# Patient Record
Sex: MALE
[De-identification: names, ages, dates, MRNs, and addresses within clinical notes are randomized per-mention and may not be internally consistent; named-entity substitution may affect disease eponyms.]

---

## 2018-11-12 ENCOUNTER — HOSPITAL ENCOUNTER (EMERGENCY)
Dept: HOSPITAL 80 - FED | Age: 48
Discharge: HOME | End: 2018-11-12
Payer: COMMERCIAL

## 2018-11-12 VITALS — SYSTOLIC BLOOD PRESSURE: 139 MMHG | DIASTOLIC BLOOD PRESSURE: 95 MMHG

## 2018-11-12 DIAGNOSIS — F17.200: ICD-10-CM

## 2018-11-12 DIAGNOSIS — F10.10: ICD-10-CM

## 2018-11-12 DIAGNOSIS — F19.10: Primary | ICD-10-CM

## 2018-11-12 NOTE — EDPHY
H & P


Stated Complaint: Benzodiazepine addiction


Time Seen by Provider: 11/12/18 19:57


HPI/ROS: 





CHIEF COMPLAINT:  Wants help with detox





HISTORY OF PRESENT ILLNESS:  Patient is a 48-year-old man with a history of 

Ativan abuse as well as alcohol abuse.  He states that he last used Ativan 

yesterday.  He originally began taking it for anxiety but then began taking a 

recreationally up to about 10 pills per day.  He has also been drinking alcohol 

and states that he stopped yesterday.  He does not show any tremors or 

tachycardia.  He states that he has had seizures from alcohol withdrawal 

before.  He was in [place] for 3 days last week but return to the 

substances after discharge.


Severity:  Moderate


Modifying factors:  Currently asymptomatic





REVIEW OF SYSTEMS:


Constitutional:  denies: chills, fever, recent illness, recent injury


EENTM: denies: blurred vision, double vision, nose congestion


Respiratory: denies: cough, shortness of breath


Cardiac: denies: chest pain, irregular heart rate, lightheadedness, palpitations


Gastrointestinal/Abdominal: denies: abdominal pain, diarrhea, nausea, vomiting, 

blood streaked stools


Genitourinary: denies: dysuria, frequency, hematuria, pain


Musculoskeletal: denies: joint pain, muscle pain


Skin: denies: lesions, rash, jaundice, bruising


Neurological: denies: headache, numbness, paresthesia, tingling, dizziness, 

weakness


Hematologic/Lymphatic: denies: blood clots, easy bleeding, easy bruising


Immunologic/allergic: denies: HIV/AIDS, transplant


 10 systems reviewed and negative except as noted





EXAM:


GENERAL:  Well-appearing, well-nourished and in no acute distress.


HEAD:  Atraumatic, normocephalic.


EYES:  Pupils equal round and reactive to light, extraocular movements intact, 

sclera anicteric, conjunctiva are normal.


ENT:  TMs normal, nares patent, oropharynx clear without exudates.  Moist 

mucous membranes.


NECK:  Normal range of motion, supple without lymphadenopathy or JVD.


LUNGS:  Breath sounds clear to auscultation bilaterally and equal.  No wheezes 

rales or rhonchi.


HEART:  Regular rate and rhythm without murmurs, rubs or gallops.


ABDOMEN:  Soft, nontender, normoactive bowel sounds.  No guarding, no rebound.  

No masses appreciated. 


BACK:  No CVA tenderness, no spinal tenderness, step-offs or deformities


EXTREMITIES:  Normal range of motion, no pitting or edema.  No clubbing or 

cyanosis.


NEUROLOGICAL:  Cranial nerves II through XII grossly intact.  Normal speech, 

normal gait.  5/5 strength, normal movement in all extremities, normal sensation

, normal reflexes


PSYCH:  Normal mood, normal affect.


SKIN:  Warm, dry, normal turgor, no visible rashes or lesions.








Source: Patient


Exam Limitations: No limitations





- Personal History


Current Tetanus/Diphtheria Vaccine: Yes


Current Tetanus Diphtheria and Acellular Pertussis (TDAP): Yes





- Medical/Surgical History


Hx Asthma: No


Hx Chronic Respiratory Disease: No


Hx Diabetes: No


Hx Cardiac Disease: No


Hx Renal Disease: No


Hx Cirrhosis: No


Hx Alcoholism: No


Hx HIV/AIDS: No


Hx Splenectomy or Spleen Trauma: No


Other PMH: alcohol seizures, high cholesterol





- Family History


Significant Family History: No pertinent family hx





- Social History


Smoking Status: Heavy smoker


Alcohol Use: Heavy


Drug Use: Other


Constitutional: 


 Initial Vital Signs











Temperature (C)  36.5 C   11/12/18 19:48


 


Heart Rate  105 H  11/12/18 19:48


 


Respiratory Rate  20   11/12/18 19:48


 


Blood Pressure  139/95 H  11/12/18 19:48


 


O2 Sat (%)  98   11/12/18 19:48








 











O2 Delivery Mode               Room Air














Allergies/Adverse Reactions: 


 





No Known Allergies Allergy (Unverified 11/12/18 19:47)


 








Home Medications: 














 Medication  Instructions  Recorded


 


NK [No Known Home Meds]  11/12/18














Medical Decision Making


ED Course/Re-evaluation: 





 is not available and neither is mental health currently.  Patient 

was recently at [facility].  Will calling them to see if he can return 

there to be evaluated for further treatment.





8:30 p.m we were able to contact [facility].  They state that they can 

make an appointment to me with him tomorrow.  Until then he can stand warming 

shelter tonight.  We have been arranging this.  The patient is happy with this 

plan.  We discussed indications for returning.


Differential Diagnosis: 





Partial list of the Differential diagnosis considered include but were not 

limited to;  polysubstance abuse, withdrawal, and although unlikely based on 

the history and physical exam, I also considered head injury, infection.  





Departure





- Departure


Disposition: Home, Routine, Self-Care


Clinical Impression: 


 Polysubstance abuse





Condition: Fair


Instructions:  Polysubstance Abuse (ED)


Additional Instructions: 


Call [facility] at [phone] to set up an appointment.


Referrals: 


NONE *PRIMARY CARE P,. [Primary Care Provider] - As per Instructions


[facility],. [Clinic] - 2-3 days, call for appt.

## 2018-11-15 ENCOUNTER — HOSPITAL ENCOUNTER (INPATIENT)
Dept: HOSPITAL 80 - FED | Age: 48
LOS: 5 days | Discharge: HOME | DRG: 880 | End: 2018-11-20
Attending: PSYCHIATRY & NEUROLOGY | Admitting: PSYCHIATRY & NEUROLOGY
Payer: COMMERCIAL

## 2018-11-15 DIAGNOSIS — Z59.0: ICD-10-CM

## 2018-11-15 DIAGNOSIS — F17.210: ICD-10-CM

## 2018-11-15 DIAGNOSIS — Z76.5: ICD-10-CM

## 2018-11-15 DIAGNOSIS — E78.5: ICD-10-CM

## 2018-11-15 DIAGNOSIS — F10.230: ICD-10-CM

## 2018-11-15 DIAGNOSIS — Z59.6: ICD-10-CM

## 2018-11-15 DIAGNOSIS — R45.851: Primary | ICD-10-CM

## 2018-11-15 DIAGNOSIS — Z73.89: ICD-10-CM

## 2018-11-15 LAB — PLATELET # BLD: 293 10^3/UL (ref 150–400)

## 2018-11-15 PROCEDURE — G0480 DRUG TEST DEF 1-7 CLASSES: HCPCS

## 2018-11-15 SDOH — ECONOMIC STABILITY - INCOME SECURITY: LOW INCOME: Z59.6

## 2018-11-15 SDOH — ECONOMIC STABILITY - HOUSING INSECURITY: HOMELESSNESS: Z59.0

## 2018-11-15 NOTE — EDPHY
H & P


Stated Complaint: SI


Time Seen by Provider: 11/15/18 15:19


HPI/ROS: 





HPI





CHIEF COMPLAINT:  Suicidal ideation.





HISTORY OF PRESENT ILLNESS:  This is a 48-year-old male, presents emergency 

room with suicidal ideation with a plan to cut his wrist with a razor blade.  

Patient has history depression.  Bipolar disorder.  He is homeless.  He states 

he is trying to get from [place] to [place].  However got stranded in 

[facility] as he does not have any money at this time and has been 

drinking alcohol.  Decided come the emergency room tonight as he has been 

feeling more suicidal more depressed.  He is off his bipolar medications.  

Typically takes Depakote.





Past Medical History:  Bipolar disorder, depression.





Past Surgical History:  No recent surgical history





Social History:  Drinks alcohol.  Smokes tobacco.  Homeless.





Family History:  Noncontributory.








ROS   


REVIEW OF SYSTEMS:


10 Systems were reviewed and negative with the exception of the elements 

mentioned in the history of present illness.








Exam   


Constitutional nontoxic,  triage nursing summary reviewed, vital signs reviewed

, awake/alert. 


Eyes   normal conjunctivae and sclera, EOMI, PERRLA. 


HENT   normal inspection, atraumatic, moist mucus membranes, no epistaxis, neck 

supple/ no meningismus, no raccoon eyes. 


Respiratory   clear to auscultation bilaterally, normal breath sounds, no 

respiratory distress, no wheezing. 


Cardiovascular   rate normal, regular rhythm, no murmur, no edema, distal 

pulses normal. 


Gastrointestinal   soft, non-tender, no rebound, no guarding, normal bowel 

sounds, no distension, no pulsatile mass. 


Genitourinary   no CVA tenderness. 


Musculoskeletal  no midline vertebral tenderness, full range of motion, no calf 

swelling, no tenderness of extremities, no meningismus, good pulses, 

neurovascularly intact.


Skin   pink, warm, & dry, no rash, skin atraumatic. 


Neurologic   awake, alert and oriented x 3, AAOx3, moves all 4 extremities 

equally, motor intact, sensory intact, CN II-XII intact, normal cerebellar, 

normal vision, normal speech. 


Psychiatric   flat affect, depressed.  Suicidal.


Heme/Lymph/Immune   no lymphadenopathy.





Differential Diagnosis:  Includes but is not limited to in a particular order 

depression, mood disorder, suicidal ideation.  Substance abuse.





Medical Decision Making:  Plan for this patient blood draw for medical 

clearance.  M1 hold.





Re-evaluation:

















Patient accepted at [address] [place] [address] by Dr. Marvin. [facility]ALA FILLED OUT. APPROPRIATE 

TRANSFER WILL BET SET UP.


Source: Patient





- Personal History


Current Tetanus Diphtheria and Acellular Pertussis (TDAP): Yes





- Medical/Surgical History


Hx Asthma: No


Hx Chronic Respiratory Disease: No


Hx Diabetes: No


Hx Cardiac Disease: No


Hx Renal Disease: No


Hx Cirrhosis: No


Hx Alcoholism: No


Hx HIV/AIDS: No


Hx Splenectomy or Spleen Trauma: No


Other PMH: alcohol seizures, high cholesterol. Major depression disorder.





- Social History


Smoking Status: Heavy smoker


Constitutional: 


 Initial Vital Signs











Temperature (C)  36.8 C   11/15/18 15:04


 


Heart Rate  112 H  11/15/18 15:04


 


Respiratory Rate  16   11/15/18 15:04


 


Blood Pressure  119/91 H  11/15/18 15:04


 


O2 Sat (%)  96   11/15/18 15:04








 











O2 Delivery Mode               Room Air














Allergies/Adverse Reactions: 


 





No Known Allergies Allergy (Unverified 11/12/18 19:47)


 








Home Medications: 














 Medication  Instructions  Recorded


 


NK [No Known Home Meds]  11/12/18














Medical Decision Making





- Data Points


Laboratory Results: 


 Laboratory Results





 11/15/18 15:25 





 11/15/18 15:25 





 











  11/15/18 11/15/18 11/15/18





  16:09 15:25 15:25


 


WBC      7.79 10^3/uL 10^3/uL





     (3.80-9.50) 


 


RBC      5.10 10^6/uL 10^6/uL





     (4.40-6.38) 


 


Hgb      16.4 g/dL g/dL





     (13.7-17.5) 


 


Hct      45.7 % %





     (40.0-51.0) 


 


MCV      89.6 fL fL





     (81.5-99.8) 


 


MCH      32.2 pg pg





     (27.9-34.1) 


 


MCHC      35.9 g/dL g/dL





     (32.4-36.7) 


 


RDW      12.4 % %





     (11.5-15.2) 


 


Plt Count      293 10^3/uL 10^3/uL





     (150-400) 


 


MPV      9.1 fL fL





     (8.7-11.7) 


 


Neut % (Auto)      64.0 % %





     (39.3-74.2) 


 


Lymph % (Auto)      27.3 % %





     (15.0-45.0) 


 


Mono % (Auto)      6.4 % %





     (4.5-13.0) 


 


Eos % (Auto)      1.3 % %





     (0.6-7.6) 


 


Baso % (Auto)      0.6 % %





     (0.3-1.7) 


 


Nucleat RBC Rel Count      0.0 % %





     (0.0-0.2) 


 


Absolute Neuts (auto)      4.98 10^3/uL 10^3/uL





     (1.70-6.50) 


 


Absolute Lymphs (auto)      2.13 10^3/uL 10^3/uL





     (1.00-3.00) 


 


Absolute Monos (auto)      0.50 10^3/uL 10^3/uL





     (0.30-0.80) 


 


Absolute Eos (auto)      0.10 10^3/uL 10^3/uL





     (0.03-0.40) 


 


Absolute Basos (auto)      0.05 10^3/uL 10^3/uL





     (0.02-0.10) 


 


Absolute Nucleated RBC      0.00 10^3/uL 10^3/uL





     (0-0.01) 


 


Immature Gran %      0.4 % %





     (0.0-1.1) 


 


Immature Gran #      0.03 10^3/uL 10^3/uL





     (0.00-0.10) 


 


Sodium    139 mEq/L mEq/L  





    (135-145)  


 


Potassium    4.1 mEq/L mEq/L  





    (3.3-5.0)  


 


Chloride    104 mEq/L mEq/L  





    ()  


 


Carbon Dioxide    25 mEq/l mEq/l  





    (22-31)  


 


Anion Gap    10 mEq/L mEq/L  





    (6-14)  


 


BUN    16 mg/dL mg/dL  





    (7-23)  


 


Creatinine    1.2 mg/dL mg/dL  





    (0.7-1.3)  


 


Estimated GFR    > 60   





    


 


Glucose    126 mg/dL H mg/dL  





    ()  


 


Calcium    9.6 mg/dL mg/dL  





    (8.5-10.4)  


 


Urine Opiates Screen  NEGATIVE     





   (NEGATIVE)   


 


Urine Barbiturates  NEGATIVE     





   (NEGATIVE)   


 


Ur Phencyclidine Scrn  NEGATIVE     





   (NEGATIVE)   


 


Ur Amphetamine Screen  NEGATIVE     





   (NEGATIVE)   


 


U Benzodiazepines Scrn  NON-NEGATIVE  H     





   (NEGATIVE)   


 


Urine Cocaine Screen  NEGATIVE     





   (NEGATIVE)   


 


U Marijuana (THC) Screen  NEGATIVE     





   (NEGATIVE)   


 


Ethyl Alcohol    < 10 mg/dL mg/dL  





    (0-10)  














Departure





- Departure


Disposition: [facility] IP


Clinical Impression: 


 Suicidal ideation





Condition: Fair


Referrals: 


NONE *PRIMARY CARE P,. [Primary Care Provider] - As per Instructions

## 2018-11-15 NOTE — ASMTTCLDSP
[facility] Discharge Disposition

 

Disposition:                  Answers:  Admit                                 

Discharge                     

Concerns/Recommendations:     

Notes:

In consultation with [facility] ED physician,Dr Carson ,and on-call psychiatrist,Dr Marvin , both 

concurred that Pt appears to meet 27-65 criteria requiring psychiatric hospitalization as Pt 

appears to be at risk of harm to self due to a mental illness condition.  Pt was given the [facility] 

prohibited belongings list while in the ED.

Was patient given the         Answers:  Yes                                   

Inpatient Behavioral                                                          

Health Prohibited                                                             

Belongings List while in                                                      

the ED?                                                                       

For inpatient                 Dr Marvin

admission, the following      

psychiatrist agreed to        

accept patient for            

admission to Behavioral       

Health ([facility]):              

Type of Hold:                 Answers:  M1/72-hour Hold                       

Hold initiated by:            Answers:  ED Physician                          

 

Date Signed:  11/15/2018 11:25 PM

Electronically Signed By:Francisca Pritchard
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
## 2018-11-16 PROCEDURE — HZ2ZZZZ DETOXIFICATION SERVICES FOR SUBSTANCE ABUSE TREATMENT: ICD-10-PCS | Performed by: PSYCHIATRY & NEUROLOGY

## 2018-11-16 RX ADMIN — ATORVASTATIN CALCIUM SCH MG: 40 TABLET, FILM COATED ORAL at 20:19

## 2018-11-16 NOTE — ASMTBHMTP
[facility] Master Treatment Plan

 

Master Treatment Plan         Answers:  Depressed Mood with                   

for:                                    Suicidal Ideation                     

Date:                         11/16/2018

Diagnosis on Admission:       Bipolar I Disorder, current or most recent episode 

                              depressed, severe 296.53

Expected length of stay:      3-5 Days

Reason for admission:         

Notes:

Pt. is a 48 year old, homeless, man who self-presented to the ED with a plan to cut his wrist with 

a razor blade. The ED physician placed him on an M1 hold. Per M1 hold, "pt is 

depressed, suicidal, probably off Depakote, homeless, substance abuse. 

Patient's stated              

presenting problems:          

Notes:

Pt. reports he was feeling depressed and having thoughts of suicide

Patient's goals for           

treatment:                    

Notes:

"Get back on medications"

Patient's strengths:          

Notes:

Generous 

Identify supports outside     

of hospital:                  

Notes:

[facility]

Discharge criteria:           

Notes:

Suicidal ideation will resolve and patient will have a plan to safely manage recurrent suicidal 

ideation. 

Initial disposition           

plan/considerations:          

Notes:

Pt. stated he wants to take a bus to [place]

Master Treatment Plan Required Signatures

 

Psychiatrist signature:       Answers:  Psychiatrist: ______________________________

RN on-shift signature:        Answers:  RN: ____________________________________

Patient signature:            Answers:  Patient: ____________________________________

 

Date Signed:  11/16/2018 12:38 PM

Electronically Signed By:Jossie Booker

## 2018-11-16 NOTE — BAPA
DATE OF SERVICE:  11/16/2018



REASON FOR ADMISSION:  Patient is a 48-year-old  male admitted after presenting to the emerg
ency department reporting suicidal ideation.  He has a history of homelessness and alcoholism and arr
ived in [place] about 2 weeks ago by his report.  He states that he "started drinking hard" when he a
rrived and did not complete his trip to [place] as previously planned.  He presented to [facility] on 11/08/2018, reporting suicidal ideations and apparently spent 3 days there.  He was 
then returned to the street and came to our facility on 11/16/2018, reporting recurrent thoughts of s
uicide.  Today, he states that he has been treated for "mood swings" in the past with Depakote, but d
enies previous treatment with antidepressants.  He has not recently been taking his Depakote, though 
he does not describe symptoms of dimitry.  He states he is out of money and his primary stress at this 
time is purchasing a bus ticket to continue on to [place].  He reports drinking at least a pint a da
y of hard alcohol, but states he has not drunk any in the past 3 days.  He states he feels anxious an
d is noted to be tremulous, but in his opinion is not in withdrawal.  He denies any history of major 
alcohol withdrawal seizures or DTs.  The patient states that he considers [place] his home but only 
has 1 aunt who lives there.  He does have previous providers whom he saw in the past there.  He state
s he has no other supports locally and no connection with any providers here.  He denies thoughts of 
suicide at this time.



PAST PSYCHIATRIC HISTORY:  Largely as above.  He has history of mood swings, treated with Depakote.  
Denies other psychotropic medicines.  He states that he has had suicidal thoughts in the past.  Davidie
s previous attempts, was most recently in [facility] for 3 days.



ALLERGIES:  No known medical allergies.



CURRENT MEDICATIONS:  None.



PAST MEDICAL HISTORY:  Noncontributory per patient report.  He specifically denies any history of sei
zures or cardiac problems.



SOCIAL HISTORY:  Patient is homeless, originally from [place].  He has no local supports and n
o family with whom he is close.  He went to [place] to seek a job and states that he was working this
 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  He denies any legal problems o
r other stresses, though states he has no money.



SUBSTANCE ABUSE HISTORY:  Patient states he drinks a pint of alcohol a day.  He has a past history of
 cocaine use, though denies recent use.  Denies other drug use at this time.  There was some report i
n the chart that he was using benzodiazepines, though he does not confirm or deny that at this time.



FAMILY HISTORY:  Patient states there is family history domestic violence and alcoholism.



ADMISSION LABORATORY:  CBC is normal.  Serum chemistries are normal with the exception of a nonfastin
g glucose of 126, triglycerides up to 298, cholesterol of 217.  Liver function is normal.  Hemoglobin
 A1c is normal at 5.1.  TSH is normal at 1.12.  Urine drug screen is positive for benzodiazepines, th
ough it is unclear whether this was before or after he reached the emergency department.  Alcohol is 
less than detectable.



MENTAL STATUS EXAMINATION:  Reveals a healthy-appearing  male.  He is lying in hospital bed 
wearing hospital garb.  He is asleep but awakens easily and interacts well with the examiner.  He is 
slightly tremulous both centrally and peripherally, though does not have hyperreflexia, flushing, or 
sweating.  He maintains good eye contact and is attentive for the purposes of the interview.  His aff
ect is constricted and anxious.  His mood is described as "not too good."  Thought process is linear 
and goal directed.  His thought content reveals no evidence of psychosis.  He specifically denies any
 auditory, visual or tactile hallucinations, or other perceptual disturbances.  He is alert and orien
nasir to person, place, time, and situation, and his sensorium is clear.  There is no evidence of delir
ium, withdrawal, or intoxication.  His intellect appears to be average as evidenced by his educationa
l and occupational history, fund of knowledge, and vocabulary.  He denies any thoughts of suicide at 
this time.  His insight and judgment appear to be fair to good.



IMPRESSION:  Alcohol use disorder, severe, homelessness, suicidal thoughts, lack of social supports, 
lack of financial resources. 



The patient is a 48-year-old  male who presents due to recurrent thoughts of suicide in the 
setting of homelessness and lack of resources.  He does not have a convincing history of true mood di
sorder and does not have evidence currently of unstable mood episode.  He does appear to be tremulous
, and this could be minor alcohol withdrawal.



PLAN:  

1.  Admit to behavior health services inpatient unit on an M1 hold.

2.  Convert M1 hold to voluntary status.

3.  Will schedule Librium taper starting today at 25 t.i.d. and going to 25 b.i.d. tomorrow.  He is m
ore than 72 hours after his last drink, and the utility of placing him on a full CIWA protocol is pro
bably limited.

4.  Will provide individual, group, and milieu psychotherapies and serial clinical interviews to best
 understand his underlying mood issue and monitor his suicidality.

5.  We will work with patient to create a safe discharge plan.



ESTIMATED LENGTH OF STAY:  3-5 days.





Job #:  [id number]

## 2018-11-16 NOTE — PDMN
Medical Necessity


Medical necessity: Pt meets inpt criteria per MD order and MCG [id number], 

Substance-Related Disorders, Adult: Inpatient Care, 2 days. 49 y/o on M1 Hold 

due to suicidal ideation admitted w/alcohol use disorder, severe, homelessness, 

suicidal thoughts, lack of social and financial supports requiring inpt 

psychiatric hospitalization.

## 2018-11-16 NOTE — BCON
INTERNAL MEDICINE CONSULTATION



DATE OF CONSULTATION:  11/16/2018



REFERRING PHYSICIAN:  Miley Marvin MD





REASON FOR REFERRAL:  Medical clearance for inpatient behavioral health stay.



HISTORY OF PRESENT ILLNESS:  This patient came to the emergency department with 
suicidal ideation.  He was homeless.  He had recently been inpatient at 
[facility].  He was evaluated by the mental health team and 
admitted for further psychiatric care. 



Currently, he is without any acute complaints.



PAST MEDICAL HISTORY:  

1.  Bipolar disorder.

2.  Dyslipidemia.

3.  Alcohol use disorder.



PAST SURGICAL HISTORY:  He denies any surgeries.



MEDICATIONS:  He was on no medications, but in the past had been taking 
valproic acid and a medication for cholesterol.



ALLERGIES:  There are no known drug allergies.



SOCIAL HISTORY:  He is homeless and per the 's note, he had been 
working in [place] and was traveling to [place].  He is a smoker, and he has a 
history of alcohol use disorder.



FAMILY HISTORY:  Noncontributory regarding medical issues.



REVIEW OF SYSTEMS:  He reports he feels a little bit shaky.  He denies sweats.  
He has some nausea, but he has a good appetite.  He denies constipation or 
diarrhea.  There is no abdominal pain.  He has no chest pain or palpitations.  
He denies cough or dyspnea.  Otherwise, a 10-point review of systems is 
negative.



PHYSICAL EXAM:  VITAL SIGNS:  Blood pressure is 130/78, heart rate is 92, 
respiratory rate is 16, oxygen saturation is 95% on room air, temperature is 
36.7 degrees centigrade.  His weight is 90.7 kg for a body mass index of 27.9.  
GENERAL:  This is an overweight man.  Sitting at the dining table, eating 
lunch.  Dressed in a green hospital smock.  Cooperative.  In no acute distress.
  HEENT:  Extraocular movements are intact.  Pupils are equal, round, and 
reactive to light.  Mucous membranes are moist.  Dentition is in good 
condition.  NECK:  Supple.  HEART:  There is a regular rate and rhythm.  He is 
tachycardic.  LUNGS:  Clear to auscultation bilaterally.  ABDOMEN:  Benign.  
EXTREMITIES:  There is no cyanosis, clubbing, or edema.  NEUROLOGIC:  He is 
alert and oriented x3.  He has a flat affect.  Cranial nerves 2 through 12 are 
grossly intact.  There is no focal weakness, and sensation is intact to light 
touch.



LABORATORY STUDIES:  From the emergency department:  CBC was normal.  Serum 
chemistries revealed normal renal function and electrolytes.  Glucose was 
slightly elevated at 126, but it was likely not fasting.  Hemoglobin A1c was 
5.1.  Liver function tests were normal.  Lipid panel revealed elevated 
triglycerides at 298, elevated cholesterol at 217, elevated LDL at 115, and an 
HDL at 42.  TSH was normal at 1.12.  Toxicology screen in the serum was 
negative for ethyl alcohol, and the urine was non-negative for benzodiazepines, 
but otherwise negative for substances of abuse.



ASSESSMENT/RECOMMENDATIONS:  

1.  Mental health issues pending further evaluation and management per 
Psychiatry and the mental health team.

2.  Tachycardia of unclear etiology.  He denies feeling thirsty.  Labs are not 
consistent with dehydration.  TSH is normal, so doubt hyperthyroidism.  He may 
be in a low-level alcohol or benzodiazepine withdrawal state.  Continue to 
monitor and consider management for alcohol or benzodiazepine withdrawal.

3.  Dyslipidemia.  As a smoker, his 10 year cardiovascular risk is 8.4%.  If he 
were to quit smoking, his 10 year cardiovascular risk would be 3.3%.  As a 
smoker, a high-dose statin is indicated for him, such as atorvastatin at 40 to 
80 mg per day.  If he were able to quit smoking, he would not meet criteria for 
statin.  I will go ahead and order atorvastatin, which he can consider 
discontinuing if he is able to quit smoking.

4.  Tobacco dependence.  Encouraged smoking cessation.

5.  Alcohol use disorder.  He might benefit from specific substance abuse 
counseling. 



I see no medical contraindications to this patient's continued stay on the 
inpatient behavioral health unit or to any psychiatric medications or 
procedures. 



Thank you very much for including me in the care of this patient, and please do 
not hesitate to contact me or the hospitalist service should there be need for 
further medical evaluation.





Job #:  [id number]

MTDD

## 2018-11-16 NOTE — ASMTCMCOM
CM Note

 

CM Note                       

Notes:

Pt. and CC completed MTP, signed and placed in chart. 



Pt. stated he wants to go to [place], [place]. Pt. stated all of his doctors are there and he can 

schedule appointments himself. Pt. agreed to let CC help with scheduling appointments. Pt. denied 

any current legal issues. Pt. stated he drinks alcohol three times a week and usually binge drinks 

beer or whiskey. Pt. stated "not really" to using THC, adding he can't remember the last time he 

used THC. Pt. denied all other substance use. Pt. denied SI, HI, and AVH. Pt. reported some 

paranoia about "don't know about what". 



Staff report pt. sleeping 7 hours and being medication compliant. Pt. has spent most of the day 

resting in bed. 

 

Date Signed:  11/16/2018 02:04 PM

Electronically Signed By:Jossie Booker

## 2018-11-17 RX ADMIN — ATORVASTATIN CALCIUM SCH MG: 40 TABLET, FILM COATED ORAL at 09:10

## 2018-11-17 NOTE — SOAPPROG
SOAP Progress Note


Assessment/Plan: 


Assessment:





49 yo homeless man who traveled from [place] to [place], recently d/c'd from [facility] where he 

was treated for alcohol w/d, admitted to  in alcohol w/d. Denies any SI/HI. 








Plan:





11/17/18 15:58


1. Patient wants bus ticket to go back to [place] where his family lives. 


2. Dr. Schwarz started patient on Librium 25mg BID for alcohol w/d. Patient 

also has Librium 25m TID PRN for breakthrough sxs. 


3. Patient denies any physical complaints. He appears diaphoretic, but denies 

any other sxs of alcohol w/d. 


4. BP has been elevated, but patient has h/o HTN which is untreated, likely 

exacerbated by ETOH w/d. His HR has been WNL. 


5. Change legal status to voluntary when hold expires. 


Subjective: 


Patient is lying in bed covered with blanket. Patient has slight tremor in both 

hands, but denies any other sxs of w/d. His HR was around 100 at lunch time, 

but has been stable since admission. He reports "really good sleep" last night, 

and has continued to sleep most of the day. He did get up and go to dining area 

for breakfast and lunch. He reports appetite is "good," he ate 100% of all 

meals and denies any N/V. Patient states he is "just passing through" [place] and 

wants to return to [place] where he's from ASAP, but has no money for bus ticket. 





Objective: 





 Vital Signs











Temp Pulse Resp BP Pulse Ox


 


 36.4 C   100   18   127/92 H  97 


 


 11/17/18 12:36  11/17/18 12:36  11/17/18 12:36  11/17/18 12:36  11/17/18 12:36








MSE: Affect: Euthymic  Mood: "Good"  TP: Linear  TC: Denies any SI/HI  Insight/

Judgment: Poor a/e/b ETOH intoxication





- Time Spent With Patient


Time Spent With Patient: 


15"








- Pending Discharge


Pending Discharge Within 24 Hours: No


Pending Discharge Within 48 Hours: Yes


Pending Discharge Date: 11/19/18 (Likely to d/c on Monday)


Pending Discharge Time: 11:00





ICD10 Worksheet


Patient Problems: 


 Problems











Problem Status Onset


 


Suicidal ideation Acute

## 2018-11-17 NOTE — ASMTCMCOM
CM Note

 

CM Note                       

Notes:

CC spoke to client briefly during check-in.  Client presents as semi-alert, affect appropriate 

towards situation.  Client denies any feelings of anxiety, depression, S/I-H/I or AVH.  Client 

suggests sleeping 10+ hours last night.  Additionally, client claims his goal for today is to "take 


my medicaitons."  CC will need to speak to MD about bus ticket information (when he might 

discharge, etc.) before filling out [employer] form due to price changes, etc.*

 

Date Signed:  11/17/2018 11:56 AM

Electronically Signed By:Cristino Barboza

## 2018-11-18 RX ADMIN — ATORVASTATIN CALCIUM SCH MG: 40 TABLET, FILM COATED ORAL at 09:04

## 2018-11-18 NOTE — SOAPPROG
SOAP Progress Note


Assessment/Plan: 


Assessment:





47 yo homeless man who traveled from [place] to [place], recently d/c'd from [facility] where he 

was treated for alcohol w/d, admitted to  in alcohol w/d. Denies any SI/HI. 








Plan:





11/17/18 15:58


1. Patient wants bus ticket to go back to [place] where his family lives. 


2. Dr. Schwarz started patient on Librium 25mg BID for alcohol w/d. Patient 

also has Librium 25m TID PRN for breakthrough sxs. 


3. Patient denies any physical complaints. He appears diaphoretic, but denies 

any other sxs of alcohol w/d. 


4. BP has been elevated, but patient has h/o HTN which is untreated, likely 

exacerbated by ETOH w/d. His HR has been WNL. 


5. Change legal status to voluntary when hold expires. 





PLAN:


11/18/18 17:36


1. Patient denies any w/d sxs. 


2. Present in milieu more and participating in groups. 


3. Change to voluntary. 





Subjective: 


Patient has had more energy and feels "better" today. He is present in milieu, 

more alert, participating in group therapy and milieu activities. He denies any 

w/d sxs, does not have tremors or sweating that MD noticed yesterday. Patient 

would like to return to [place] after discharge, but doesn't have money for bus 

ticket. 





Objective: 





 Vital Signs











Temp Pulse Resp BP Pulse Ox


 


 36.6 C   80   15   124/78 H  94 


 


 11/18/18 06:00  11/18/18 06:00  11/18/18 06:00  11/18/18 06:00  11/18/18 06:00








MSE: Affect: Euthymic  Mood: "OK"  TP: Linear  TC: Denies SI/HI  Insight/

Judgment: Fair





- Time Spent With Patient


Time Spent With Patient: 


15"








- Pending Discharge


Pending Discharge Within 24 Hours: Yes


Pending Discharge Within 48 Hours: No


Pending Discharge Date: 11/19/18 (Likely to d/c on Monday )


Pending Discharge Time: 11:00





ICD10 Worksheet


Patient Problems: 


 Problems











Problem Status Onset


 


Suicidal ideation Acute

## 2018-11-18 NOTE — ASMTCMCOM
CM Note

 

CM Note                       

Notes:

The patient requested to see the ; the  was paged and they notified [facility] staff of 


their plan to see the patient later today. The patient reported that he is "doing good" today. He 

requested housing resources upon discharge. 

 

Date Signed:  11/18/2018 12:31 PM

Electronically Signed By:Sommer Fishman

## 2018-11-19 VITALS — SYSTOLIC BLOOD PRESSURE: 127 MMHG | DIASTOLIC BLOOD PRESSURE: 73 MMHG

## 2018-11-19 RX ADMIN — ATORVASTATIN CALCIUM SCH MG: 40 TABLET, FILM COATED ORAL at 08:55

## 2018-11-19 NOTE — ASMTBHDC
Notes

 

Note:                         

Notes:

Pt. reports feeling "okay, just waking up". Pt. reports he slept "good", and is eating 

well. Pt. reports attending groups. Pt. reports no issues with his current medications. Pt. stated 

he weekend went "pretty good, went to art groups". Pt. stated he wants to discharge and take a bus 

to [place]. Pt. stated he can setup his own follow up appointments in [place], adding it will only take him a 


few days to setup. Pt. stated he can fill and take his medications. Pt. denied SI, HI, AVH and 

paranoia. 



Staff report pt. sleeping 11.5 hours and being medication compliant.  [facility] 

[facility] to [facility] on Tuesday 11/20/18.

 

Date Signed:  11/19/2018 12:58 PM

Electronically Signed By:Jossie Booker

## 2018-11-19 NOTE — SOAPPROG
SOAP Progress Note


Assessment/Plan: 


Assessment:





Alcohol Use Disorder, Severe.  Improved.  Plan for discharge tomorrow.  Patient 

could benefit from continued inpatient hospitalization for crisis stabilization

, safety, and medication evaluation.  








Plan:





1. Psychotropic medications: After reviewing options, risks, and benefits 

patient agrees to continue current medications with following changes: decrease 

Librium to 25 mg po QD for taper; discontinue Librium PRN.  No other medication 

changes at this time as more time is needed to determine ongoing tolerability 

and efficacy.  Plan is to continue to observe patient for response and side 

effects from medications, and ongoing monitoring and evaluation.  


2. Review with patient informed consent and recommendations for psychotropic 

medication treatment listed below


3. Labs: no additional labs at this time


4. Therapy: continue milieu and group therapy  


5. Further investigation including gathering information from patients 

relatives and review of past case records to inform treatment plan.


6. Safety/Wellness plan and follow-up outpatient appointments to be established 

prior to discharge.  Next steps are for patient to meet with care manager to 

plan a safe discharge plan and establish outpatient services for ongoing 

treatment.  


7. Confer with inpatient treatment team regarding treatment plan.  


8. Psychosocial stressors addressed through  


9. Legal status: voluntary


10. Consider discharge on Tuesday if patient is in stable condition, safe, and 

has a safe discharge plan.   


11. Substance abuse interventions: alcohol








PSYCHOTROPIC MEDICATION TREATMENT INFORMED CONSENT and RECOMMENDATIONS: Review 

nature of condition, diagnosis, and prognosis.  Review nature and purpose of 

psychotropic medication treatment.  Review type of psychotropic medications 

being ordered.  Review risk and benefits of psychotropic medication treatment.  

Review probable length of time patient will need to take medications.  Review 

risk and benefits of not undergoing psychotropic medication treatment.  Review 

alternative treatments to psychotropic medications.  Review psychotropic 

medications contraindications, drug-drug interactions, side effects, and 

importance of reporting any side effects to a psychiatric provider or nurse 

during inpatient hospitalization, and upon discharge to patients psychiatric 

outpatient provider, primary care provider, or other health care professional.  

Review importance of asking a nurse, psychiatric provider, or primary care 

provider any questions or problems concerning the psychotropic medications.  

Verify patient understands the information that has been provided, and 

understands, accepts, and agrees to psychotropic medications.  





Review patients safety plan and importance of patient to report to staff while 

hospitalized if patient is ever a danger to self/others, or unable to care for 

self, and upon discharge, the importance for patient to contact [facility] or [facility], or go to the nearest emergency room, if patient is ever a 

danger to self/others, or unable to care for self.  Recommend that upon 

discharge patient establish medication management treatment with a psychiatric 

provider, establishes routine therapy appointments, and follow-up with primary 

care provider.  Verify patient understands and agrees to these recommendations.








11/19/18 09:18





Subjective: 


Following up with patient for evaluation of depression, anxiety, and safety.  

Patient reports, "Doing a little, better, got some rest."  Patient expresses 

the following psychiatric symptoms none.  Patient describes getting 8 hours of 

sleep, and reports feeling rested today.  Patient reports no symptoms of 

alcohol withdrawal and agrees to decrease Librium to 25 mg po QD for taper and 

discontinue Librium PRN.  Patient requests assistance with transportation to [place], [place] to be near his family. 





Objective: 





 Vital Signs











Temp Pulse Resp BP Pulse Ox


 


 36.2 C   67   15   113/68   94 


 


 11/19/18 06:00  11/19/18 06:00  11/19/18 06:00  11/19/18 06:00  11/19/18 06:00








NURSING REPORT: Consulted with nursing for update on patients progress in 

treatment.  Nurses report patient is not engaged in treatment, is not attending 

groups, spends majority of time in bed sleeping; slept 8 hours, expresses the 

following psychiatric symptoms: none, exhibits the following psychiatric 

symptoms: withdrawn, flat affect; is eating all meals, and denies SI/HI, denies 

A/V hallucinations, and denies delusions.





CASE COORDINATOR UPDATE: currently planning safe discharge.





SUBSTANCE ABUSE BRIEF INTERVENTION: Brief intervention regarding the risks of 

alcohol abuse is provided to patient with goal to reduce the risk of harm that 

could result from the continued use of alcohol, with the general aim to 

investigate the problem, raise awareness of problem, develop a solution with 

the patient, recommend a specific change or activity, and motivate the patient 

toward change.  Assess substance abuse behavior and give supportive advice 

about harm reduction, recommend a reduction in hazardous/at-risk consumption 

patterns, and facilitate referrals for additional specialized treatment with 

care coordinator.  Intermediate goal is for the patient to quit and attend 

outpatient substance abuse treatment.  Intervention focus on intermediate goals 

to allow for more immediate success in the treatment process to keep the 

patient motivated.  Review following with patient: Alcohol/Binge Drinking risks

: short-term: injuries, violence, alcohol poisoning, risky sexual behaviors.  

Long-term: high blood pressure, stroke, liver disease, digestive problems, 

cancer, learning and memory problems, depression and anxiety, social problems, 

and alcohol dependence.  





OUTPATIENT SUBSTANCE ABUSE TREATMENT: Patient referred to outpatient provider 

and treatment for continued treatment related to substance abuse.





MSE: The patient presents casually dressed and with good hygiene, and looks 

stated age.  Patient is sitting, posture is upright, and position is relaxed.  

Patient appears awake, alert, and responds appropriately and reasonably during 

interview.  Patient is engaged, relates well to interviewer, and emotional 

facial expression is appropriate to situation and changes appropriately with 

topic.  Patient is cooperative, makes comfortable eye contact, and movements 

are voluntary, deliberate, coordinated, and smooth and even with no 

inappropriate movements.  Patient makes laryngeal sounds effortlessly and 

shares conversation appropriately; pace of conversation is appropriate, and 

stream of talking is fluent; articulation is clear and understandable; word 

choice is effortless and appropriate for education level; completes sentences, 

occasionally pausing to think; rate and volume are appropriate for interview 

and setting.  Patient reports mood as euthymic.  Patients affect is stable with 

full variable range, congruent with mood, and appropriate to speech and 

circumstances.  Patient has linear and logical thinking, with no loose 

associations, tangential thought, thought blocking, concrete thinking, or any 

other signs of formal thought disorder.  Patient denies suicidal and homicidal 

ideation, and denies hallucinations and delusions.  Patient appears to be a 

fair historian with fair judgement and poor insight into current condition.  

Patient has no apparent dysfunction in recent or remote memory noted, and no 

evidence of gross cognitive dysfunction noted at any point during the interview.











- Time Spent With Patient


Time Spent With Patient: 


15 minutes, met with patient individually.








- Pending Discharge


Pending Discharge Within 24 Hours: Yes


Pending Discharge Within 48 Hours: No


Pending Discharge Date: 11/20/18


Pending Discharge Time: 11:00





ICD10 Worksheet


Patient Problems: 


 Problems











Problem Status Onset


 


Suicidal ideation Acute

## 2018-11-20 NOTE — BDS
REASON FOR ADMISSION:  From the ED note dated 11/15/2018, patient presented to 
the emergency room with suicidal ideation with plan to cut his wrists with a 
razor blade.  The patient is homeless, has a history of depression.  Patient 
reports he is trying to get from [place], [place] to [place].  The patient 
reported being stranded in [place], [place], did not have any money, and had 
been drinking alcohol.  The patient reported he decided to come to the 
emergency room because he felt more suicidal and more depressed.  The patient 
was admitted involuntarily on an M1 hold due to being a danger to himself.  The 
patient was admitted for safety, crisis stabilization, and medication 
management.



ADMITTING DIAGNOSES:  1. Alcohol use disorder, severe.  2. R/O malingering.



ADMISSION PHYSICAL EXAM:  Patient was seen on 11/16/2018, for an internal 
medicine consultation for medical clearance for inpatient psychiatric 
hospitalization and treatment.  The patient was medically cleared for inpatient 
psychiatric hospitalization and treatment.  For further details, please refer 
to consultation note dated 11/16/2018.



ADMISSION LABS:  CBC from 11/15/2018, within normal limits.  BMP from 11/15/2018
, within normal limits, except glucose was elevated at 126.  Hemoglobin A1c was 
within normal limits at 5.1, from 11/15/2018.  Liver function from 11/15/2018, 
within normal limits.  Lipid panel from 11/15/2018, within normal limits, 
except triglycerides were elevated at 298.  Cholesterol was elevated at 217. 
Cholesterol risk factor was elevated at 1.2.  LDL cholesterol calculated was 
elevated at 115.  VLDL cholesterol was elevated at 60.  Non-HDL cholesterol was 
elevated at 175.  Cholesterol/HDL ratio was elevated at 5.17.  TSH from 11/15/
2018, was within normal limits at 1.120.  Toxicology screen from 11/15/2018, 
was non-negative for benzodiazepines, negative for all other substances tested 
and negative for ethyl alcohol.



MAJOR PROCEDURES OR TESTS:  None.



HOSPITAL COURSE:  The most prominent symptoms and behaviors while the patient 
was here were reports of mild depression and mild anxiety.  The patient was 
withdrawn to his room, spent the majority of his time sleeping in his room, was 
up for meals.  Patient did attend to his ADLs appropriately and independently.  
Treatment modalities utilized were milieu and group therapy.  Librium was 
started to target alcohol withdrawal symptoms, was tolerated with no report of 
side effects and with good response.  Librium was safely tapered and 
discontinued prior to patient discharging.  Patient has improved considerably, 
with no signs of psychiatric symptoms and no psychiatric symptoms expressed at 
time of discharge.  Patient reports he has improved since admission, states to 
be in stable condition, feels safe to discharge, and he contracts for safety.  
Patient's response to treatment was good.  There were no adverse or unexpected 
results of treatment.  The patient was safe throughout his stay and was 
appropriate with staff and other patients.  The treatment team consensus is the 
patient is in stable condition, has a safe discharge plan, and is ready to 
discharge today.



CONDITION ON DISCHARGE:  Patient is in stable condition and is no longer a 
danger to self or others, and is not gravely disabled due to mental illness.  
Patient is no longer in need of inpatient level of care, and can be safely and 
effectively treated within the community.  The patients level of risk at time 
of discharge is low.  MSE: The patient is casually dressed and with good hygiene
, and looks stated age.  Patient is sitting, posture is upright, and position 
is relaxed.  Patient appears awake, alert, and responds appropriately and 
reasonably during interview.  Patient is engaged, relates well to interviewer, 
and emotional facial expression is appropriate to situation and changes 
appropriately with topic.  Patient is cooperative, makes comfortable eye contact
, and movements are voluntary, deliberate, coordinated, and smooth and even 
with no inappropriate movements.  Patient makes laryngeal sounds effortlessly 
and shares conversation appropriately; pace of conversation is appropriate, and 
stream of talking is fluent; articulation is clear and understandable; word 
choice is effortless and appropriate for education level; completes sentences, 
occasionally pausing to think; rate and volume are appropriate for interview 
and setting.  Patient reports mood as euthymic.  Patients affect is stable with 
full variable range, congruent with mood, and appropriate to speech and 
circumstances.  Patient has linear and logical thinking, with no loose 
associations, tangential thought, thought blocking, concrete thinking, or any 
other signs of formal thought disorder.  Patient denies suicidal and homicidal 
ideation, and denies hallucinations and delusions.  Patient appears to be a 
reliable historian with sound judgement and good insight into current 
condition.  Patient has no apparent dysfunction in recent or remote memory noted
, and no evidence of gross cognitive dysfunction noted at any point during the 
interview.



DISCHARGE DIAGNOSES:  

1.  Alcohol use disorder, severe.  

2.  Malingering.



CURRENT MEDICATIONS:  After reviewing options, risks, and benefits with the 
patient, the patient states that he does not need to be on medications at this 
time.  The patient reports no signs or symptoms of alcohol withdrawal and 
reports mild anxiety and depression.  The patient reports he plans to follow up 
with his providers in [place] for ongoing medication evaluation and 
monitoring.  Patient requests a prescription for Lipitor 40 mg p.o. daily.  
Prescription is provided for 30 days.  The prescription is reviewed with the 
patient at time of discharge to ensure accuracy and patient understanding.



DISPOSITION:  The patient left hospital independently and voluntarily with RN.  
RN walked the patient to the bus stop.  The patient plans on taking the bus to 
[place] and then taking [employer] [place] [facility]nd bus to [place].



FOLLOWUP:  Care coordinator reports the appropriate outpatient follow-up 
services have been established and outpatient appointments have been scheduled.
  The patient received written instructions with times and dates of outpatient 
follow-up appointments.  The following follow-up recommendations were provided 
to the patient at discharge: Continue psychotropic medications as prescribed 
and attend appointments as scheduled.  Report any side effects to a psychiatric 
outpatient provider, a primary care provider, or other health care 
professional.  Address any questions or problems concerning the psychotropic 
medications with a psychiatric outpatient provider, a primary care provider, or 
other health care professional.  Contact [facility] or [facility], or go 
to the nearest emergency room, if you are ever a danger to yourself/others, or 
unable to care for yourself.  As soon as possible, establish a routine 
medication management treatment with a psychiatric provider, establish routine 
therapy appointments, and follow-up with a primary care provider.  



SUBSTANCE ABUSE BRIEF INTERVENTION: Brief intervention regarding the risks of 
alcohol abuse is provided to patient with goal to reduce the risk of harm that 
could result from the continued use of alcohol, with the general aim to 
investigate the problem, raise awareness of problem, develop a solution with 
the patient, recommend a specific change or activity, and motivate the patient 
toward change.  Assess substance abuse behavior and give supportive advice 
about harm reduction, recommend a reduction in hazardous/at-risk consumption 
patterns, and facilitate referrals for additional specialized treatment with 
care coordinator.  Intermediate goal is for the patient to quit and attend 
outpatient substance abuse treatment.  Intervention focus on intermediate goals 
to allow for more immediate success in the treatment process to keep the 
patient motivated.  Review following with patient: Alcohol/Binge Drinking risks
: short-term: injuries, violence, alcohol poisoning, risky sexual behaviors.  
Long-term: high blood pressure, stroke, liver disease, digestive problems, 
cancer, learning and memory problems, depression and anxiety, social problems, 
and alcohol dependence.  



OUTPATIENT SUBSTANCE ABUSE TREATMENT: Patient referred to outpatient provider 
and treatment for continued treatment related to substance abuse.



LEGAL COURSE:  The patient was admitted involuntarily for inpatient psychiatric 
hospitalization.  Patient became voluntary during his stay.  Patient discharged 
today independently and voluntarily.



ATTITUDE AT TIME OF DISCHARGE:  The patients attitude was positive at time of 
discharge, and patient reports looking forward to discharging today.  The 
patient reports he feels safe to discharge, is no longer a danger to himself or 
others, is in stable condition, and contracts for safety.  Patient states he 
will continue medications as prescribed, and establish medication management 
treatment with an outpatient provider after discharge.  Patient reports he 
understands the information that has been provided to him, and he understands, 
accepts, and agrees to psychotropic medications.  Patient describes internal 
protective factors as the coping skills he has learned while hospitalized here, 
and he plans to continue to practice these coping skills after discharge.  



LABS AND STUDIES:  There were no pending labs or studies at time of discharge.



ADVANCE DIRECTIVES:  There were no advance directives on file, and patient was 
full code during this hospitalization.



The following treatment informed consent and recommendations were provided to 
the patient at time of discharge.  Patient reports he understands, accepts, and 
agrees to the information that has been provided.   



TREATMENT INFORMED CONSENT and RECOMMENDATIONS: Review nature of condition, 
diagnosis, and prognosis.  Review nature and purpose of medication treatment. 
Review type of medications being prescribed.  Review risk and benefits of 
medication treatment.  Review probable length of time will need to take 
medications.  Review risk and benefits of not undergoing medication treatment.  
Review alternative treatments to medications.  Review medications 
contraindications, side effects, and importance of reporting any side effects 
to a psychiatric provider, primary care provider, or other health care 
professional.  Review importance of asking a psychiatric provider or primary 
care provider any questions or problems concerning medications.  



Review safety plan and the importance to contact [facility] or [facility]
, or go to the nearest emergency room, if ever a danger to yourself/others, or 
unable to care for yourself.  Recommend upon discharge to establish routine 
medication management treatment with a psychiatric provider, establish routine 
therapy appointments, and follow-up with a primary care provider.  Verify 
patient understands, accepts, and agrees to the information that has been 
provided. 



Job #:  [id number]

MTDD